# Patient Record
Sex: FEMALE | Race: WHITE | URBAN - METROPOLITAN AREA
[De-identification: names, ages, dates, MRNs, and addresses within clinical notes are randomized per-mention and may not be internally consistent; named-entity substitution may affect disease eponyms.]

---

## 2017-11-09 ENCOUNTER — APPOINTMENT (RX ONLY)
Dept: URBAN - METROPOLITAN AREA CLINIC 22 | Facility: CLINIC | Age: 42
Setting detail: DERMATOLOGY
End: 2017-11-09

## 2017-11-09 DIAGNOSIS — Z41.1 ENCOUNTER FOR COSMETIC SURGERY: ICD-10-CM

## 2017-11-09 DIAGNOSIS — T85.44 CAPSULAR CONTRACTURE OF BREAST IMPLANT: ICD-10-CM

## 2017-11-09 PROBLEM — T85.44XA CAPSULAR CONTRACTURE OF BREAST IMPLANT, INITIAL ENCOUNTER: Status: ACTIVE | Noted: 2017-11-09

## 2017-11-09 PROCEDURE — ? PATIENT SPECIFIC COUNSELING

## 2017-11-09 PROCEDURE — ? CONSULTATION - BREAST (COSMETIC)

## 2017-11-09 PROCEDURE — 99003: CPT

## 2017-11-09 PROCEDURE — ? COUNSELING - CAPSULAR CONTRACTURE

## 2017-11-09 ASSESSMENT — LOCATION SIMPLE DESCRIPTION DERM
LOCATION SIMPLE: LEFT BREAST
LOCATION SIMPLE: RIGHT BREAST
LOCATION SIMPLE: RIGHT BREAST
LOCATION SIMPLE: LEFT BREAST

## 2017-11-09 ASSESSMENT — BAKER GRADE, RIGHT BREAST
IN YOUR EXPERIENCE, AMONG ALL PATIENTS YOU HAVE SEEN WITH THIS CONDITION, HOW SEVERE IS THE RIGHT BREAST CONTRACTURE?: SOMEWHAT FIRM, BUT NORMAL IN APPEARANCE

## 2017-11-09 ASSESSMENT — LOCATION ZONE DERM
LOCATION ZONE: TRUNK
LOCATION ZONE: TRUNK

## 2017-11-09 ASSESSMENT — LOCATION DETAILED DESCRIPTION DERM
LOCATION DETAILED: RIGHT LATERAL BREAST 6-7:00 REGION
LOCATION DETAILED: RIGHT MEDIAL BREAST 5-6:00 REGION
LOCATION DETAILED: LEFT MEDIAL BREAST 8-9:00 REGION
LOCATION DETAILED: LEFT MEDIAL BREAST 8-9:00 REGION

## 2017-11-09 ASSESSMENT — BAKER GRADE, LEFT BREAST
IN YOUR EXPERIENCE, AMONG ALL PATIENTS YOU HAVE SEEN WITH THIS CONDITION, HOW SEVERE IS THE LEFT BREAST CONTRACTURE?: NORMALLY SOFT AND NATURAL IN SIZE AND SHAPE

## 2017-11-09 NOTE — PROCEDURE: PATIENT SPECIFIC COUNSELING
Detail Level: Detailed
Other (Free Text): Patient was provided with a hand written order for a bilateral 3-D mammogram screening. Patient instructed to follow-up after mammogram to discuss if surgical options are needed

## 2017-11-09 NOTE — HPI: BREAST (AESTHETIC DEFORMITY, BREAST)
Which Breast(S) Are You Concerned About?: left breast
Is This A New Presentation, Or A Follow-Up?: Breast (Aesthetic Breast Deformity)
Additional History: Patient had breast augmentation in [place] in 2011. Patient with silicone implants in place, patient unaware of the size or style of implants. Patient with the following concerns: patient states she feels that her left implant has grown more mobile in the pocket laterally, as well as patient has noticed a \"warm sensation\"radiating in her left breast on and off for the past 3-4 weeks.